# Patient Record
Sex: FEMALE | Race: WHITE | Employment: OTHER | URBAN - METROPOLITAN AREA
[De-identification: names, ages, dates, MRNs, and addresses within clinical notes are randomized per-mention and may not be internally consistent; named-entity substitution may affect disease eponyms.]

---

## 2017-12-05 ENCOUNTER — HOSPITAL ENCOUNTER (OUTPATIENT)
Dept: PHYSICAL THERAPY | Age: 77
Discharge: HOME | End: 2017-12-05
Payer: MEDICARE

## 2017-12-05 PROCEDURE — 97162 PT EVAL MOD COMPLEX 30 MIN: CPT

## 2017-12-05 PROCEDURE — G8979 MOBILITY GOAL STATUS: HCPCS

## 2017-12-05 PROCEDURE — G8978 MOBILITY CURRENT STATUS: HCPCS

## 2017-12-05 PROCEDURE — 97110 THERAPEUTIC EXERCISES: CPT

## 2017-12-05 NOTE — PROGRESS NOTES
PHYSICAL THERAPY - DAILY TREATMENT NOTE    Patient Name: Raymon Crabtree        Date: 2017  : 1940   yes Patient  Verified  Visit #:     Insurance: Payor: Jimmy Negron / Plan: [place] MEDICARE PART A & B / Product Type: Medicare /      In time: 9:30 Out time: 10:18   Total Treatment Time: 48     Medicare Time Tracking (below)   Total Timed Codes (min):  na 1:1 Treatment Time:  na     TREATMENT AREA =  Low back pain [M54.5]    SUBJECTIVE  Pain Level (on 0 to 10 scale):   10   Medication Changes/New allergies or changes in medical history, any new surgeries or procedures?    no  If yes, update Summary List   Subjective Functional Status/Changes:  []  No changes reported     See POC          OBJECTIVE  10 min Therapeutic Exercise:  [x]  See flow sheet   Rationale:      increase ROM and increase strength to improve the patients ability to ambulate. Stand, transfer      min Patient Education:  yes  Reviewed HEP   []  Progressed/Changed HEP based on: Other Objective/Functional Measures:    Pt demo I w/ HEP  Inc L HS cramping while performing supine TA draw; cues for muscle isolation reduced hs cramp     Post Treatment Pain Level (on 0 to 10) scale:   3  / 10     ASSESSMENT  Assessment/Changes in Function:     See POC     []  See Progress Note/Recertification   Patient will continue to benefit from skilled PT services to modify and progress therapeutic interventions, address functional mobility deficits, address ROM deficits, address strength deficits, analyze and address soft tissue restrictions, analyze and cue movement patterns, analyze and modify body mechanics/ergonomics, address imbalance/dizziness and instruct in home and community integration to attain remaining goals.    Progress toward goals / Updated goals:    See POC     PLAN  []  Upgrade activities as tolerated yes Continue plan of care   []  Discharge due to :    []  Other:      Therapist: Bautista Dong, PT    Date: 12/5/2017 Time: 9:14 AM     Future Appointments  Date Time Provider Sekou Major   12/5/2017 9:30 AM EPI [place] [facility]
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
## 2017-12-07 ENCOUNTER — HOSPITAL ENCOUNTER (OUTPATIENT)
Dept: PHYSICAL THERAPY | Age: 77
Discharge: HOME | End: 2017-12-07
Payer: MEDICARE

## 2017-12-07 PROCEDURE — 97110 THERAPEUTIC EXERCISES: CPT

## 2017-12-07 PROCEDURE — 97140 MANUAL THERAPY 1/> REGIONS: CPT

## 2017-12-11 ENCOUNTER — HOSPITAL ENCOUNTER (OUTPATIENT)
Dept: PHYSICAL THERAPY | Age: 77
Discharge: HOME | End: 2017-12-11
Payer: MEDICARE

## 2017-12-11 PROCEDURE — 97140 MANUAL THERAPY 1/> REGIONS: CPT

## 2017-12-11 NOTE — PROGRESS NOTES
PHYSICAL THERAPY - DAILY TREATMENT NOTE    Patient Name: Lizzie Butler        Date: 2017  : 1940   {YES/NO DIET:280920208} Patient  Verified  Visit #:   ***   of   ***  Insurance: Payor: Fang Favors / Plan: [place] MEDICARE PART A & B / Product Type: Medicare /      In time: *** Out time: ***   Total Treatment Time: ***     Medicare Time Tracking (below)   Total Timed Codes (min):  *** 1:1 Treatment Time:  ***     TREATMENT AREA =  Low back pain [M54.5]    SUBJECTIVE  Pain Level (on 0 to 10 scale):  ***  / 10   Medication Changes/New allergies or changes in medical history, any new surgeries or procedures?     {YES/NO DIET:779633347}  If yes, update Summary List   Subjective Functional Status/Changes:  []  No changes reported     ***          OBJECTIVE  Modalities Rationale:     {[facility] INMOTION MODALITIES:28639} to improve patient's ability to ***   min [] Estim, type/location:                                      []  att     []  unatt     []  w/US     []  w/ice    []  w/heat    min []  Mechanical Traction: type/lbs                   []  pro   []  sup   []  int   []  cont    []  before manual    []  after manual    min []  Ultrasound, settings/location:      min []  Iontophoresis w/ dexamethasone, location:                                               []  take home patch       []  in clinic    min []  Ice     []  Heat    location/position:     min []  Vasopneumatic Device, press/temp:     min []  Other:    [] Skin assessment post-treatment (if applicable):    []  intact    []  redness- no adverse reaction     []redness  adverse reaction:        *** min Therapeutic Exercise:  [x]  See flow sheet   Rationale:      {[facility] IMMOTION THER EX:65593} to improve the patients ability to ***     *** min Manual Therapy:    Rationale:      {[facility] IMMOTION MANUAL THERAPY:76554} to improve patient's ability to ***     min Therapeutic Activity:    Rationale:    {[facility] IMMOTION THER EX:08196} to improve the patients ability to ***     min Neuromuscular Re-ed:    Rationale:    {[facility] IMMOTION THER EX:27131} to improve the patients ability to ***     min Gait Training:    Rationale:         min Patient Education:  {YES/NO DIET:639485126}  Reviewed HEP   []  Progressed/Changed HEP based on: Other Objective/Functional Measures:    ***     Post Treatment Pain Level (on 0 to 10) scale:   ***  / 10     ASSESSMENT  Assessment/Changes in Function:     ***     []  See Progress Note/Recertification   Patient will continue to benefit from skilled PT services to {[facility] INMOTION ASSESSMENT STATEMENTS:20159} to attain remaining goals. Progress toward goals / Updated goals:    ***     PLAN  []  Upgrade activities as tolerated {YES/NO DIET:102691166} Continue plan of care   []  Discharge due to :    []  Other:      Therapist: Monica Dasilva PT    Date: 12/11/2017 Time: 10:13 AM     Future Appointments  Date Time Provider Sekou Major   12/14/2017 12:00 PM Kenya Hudson, PT [facility]   12/19/2017 9:30 AM Kenya Ham [facility]   12/27/2017 3:30 PM Monica Dasilva, PT [facility]   12/29/2017 9:30 AM David Tony, PT [facility]   1/2/2018 9:30 AM Kenya Hudson, PT [facility]   1/5/2018 9:30 AM Regina Paige, PT [facility]   1/9/2018 9:30 AM Monica Dasilva, PT [facility]   1/11/2018 9:30 AM Kenya Hudson, [address] [place] [address]

## 2017-12-11 NOTE — PROGRESS NOTES
PHYSICAL THERAPY - DAILY TREATMENT NOTE    Patient Name: Neeraj Goodwin        Date: 2017  : 1940   yes Patient  Verified  Visit #:   3   of   12  Insurance: Payor: Cristine Calhoun / Plan: [place] MEDICARE PART A & B / Product Type: Medicare /      In time: 9:55 Out time: 11:05   Total Treatment Time: 70     Medicare Time Tracking (below)   Total Timed Codes (min):  60 1:1 Treatment Time:  15     TREATMENT AREA =  Low back pain [M54.5]    SUBJECTIVE  Pain Level (on 0 to 10 scale): 0  / 10   Medication Changes/New allergies or changes in medical history, any new surgeries or procedures?    no  If yes, update Summary List   Subjective Functional Status/Changes:  []  No changes reported     Pt reports tightness/soreness worse L side l/s paraspinals today. Pt denies falls or red flags. Pt reports compliance with HEP.          OBJECTIVE  Modalities Rationale:     decrease pain and decrease muscle spasm to improve patient's ability to complete amb, standing, transfers   min [] Estim, type/location:                                      []  att     []  unatt     []  w/US     []  w/ice    []  w/heat    min []  Mechanical Traction: type/lbs                   []  pro   []  sup   []  int   []  cont    []  before manual    []  after manual    min []  Ultrasound, settings/location:      min []  Iontophoresis w/ dexamethasone, location:                                               []  take home patch       []  in clinic   10 min []  Ice     [x]  Heat    location/position: Supine to l/s and glutes    min []  Vasopneumatic Device, press/temp:     min []  Other:    [x] Skin assessment post-treatment (if applicable):    [x]  intact    [x]  no adverse reaction     []redness  adverse reaction:        45 min Therapeutic Exercise:  [x]  See flow sheet (Not billed)   Rationale:      increase ROM, increase strength and improve balance to improve the patients ability to complete amb, standing, transfers    15 min Manual Therapy: STM to L>R l/s paraspinals, glute max/med, piriformis in prone. (Billed 15 minutes)   Rationale:      decrease pain, increase ROM and decrease trigger points to improve patient's ability to complete amb, standing, transfers    N/A min Patient Education:  yes  Reviewed HEP   []  Progressed/Changed HEP based on: Other Objective/Functional Measures:    Increased spasm L l/s paraspinals and pirifromis     Post Treatment Pain Level (on 0 to 10) scale:   2 / 10     ASSESSMENT  Assessment/Changes in Function:   Pt reported soreness at end of session. Pt denied sharp pains or red flags with therapeutic ex. []  See Progress Note/Recertification   Patient will continue to benefit from skilled PT services to modify and progress therapeutic interventions, address functional mobility deficits, address ROM deficits, address strength deficits, analyze and address soft tissue restrictions, analyze and cue movement patterns, analyze and modify body mechanics/ergonomics and address imbalance/dizziness to attain remaining goals. Progress toward goals / Updated goals:  Pt reports compliance with HEP - progressing towards STG #1. PLAN  [x]  Upgrade activities as tolerated yes Continue plan of care   []  Discharge due to :    []  Other:      Therapist: Ballard Blizzard, PT    Date: 12/11/2017 Time: 10:00 AM     Future Appointments  Date Time Provider Sekou Major   12/14/2017 12:00 PM Kenya Rome, PT [facility]   12/19/2017 9:30 AM Kenya Cason [facility]   12/21/2017 10:30 AM Ballard Blizzard, PT [facility]   12/28/2017 10:00 AM Kenya Rome, PT [facility]   12/29/2017 9:30 AM Daina Tony, PT [facility]   1/2/2018 9:30 AM Kenya Rome, PT [facility]   1/5/2018 9:30 AM Kayleigh Lima, PT [facility]   1/9/2018 9:30 AM Ballard Blizzard, PT [facility]   1/11/2018 9:30 AM Kenya Rome, [facility]

## 2017-12-14 ENCOUNTER — HOSPITAL ENCOUNTER (OUTPATIENT)
Dept: PHYSICAL THERAPY | Age: 77
Discharge: HOME | End: 2017-12-14
Payer: MEDICARE

## 2017-12-14 PROCEDURE — 97110 THERAPEUTIC EXERCISES: CPT

## 2017-12-14 PROCEDURE — 97140 MANUAL THERAPY 1/> REGIONS: CPT

## 2017-12-14 NOTE — PROGRESS NOTES
PHYSICAL THERAPY - DAILY TREATMENT NOTE    Patient Name: Royal Wolff        Date: 2017  : 1940   yes Patient  Verified  Visit #:     Insurance: Payor: Lila Beans / Plan: [place] MEDICARE PART A & B / Product Type: Medicare /      In time:  Out time:    Total Treatment Time: 62     Medicare Time Tracking (below)   Total Timed Codes (min):  56 1:1 Treatment Time:  41     TREATMENT AREA =  Low back pain [M54.5]    SUBJECTIVE  Pain Level (on 0 to 10 scale):  3  / 10   Medication Changes/New allergies or changes in medical history, any new surgeries or procedures?    no  If yes, update Summary List   Subjective Functional Status/Changes:  []  No changes reported     \"Tuesday I cleared a lot of stuff in my yard. I was on my knees and pulling out the weeds. I had to lay low yesterday\"          OBJECTIVE  Modalities Rationale:       min [] Estim, type/location:                                      []  att     []  unatt     []  w/US     []  w/ice    []  w/heat    min []  Mechanical Traction: type/lbs                   []  pro   []  sup   []  int   []  cont    []  before manual    []  after manual    min []  Ultrasound, settings/location:      min []  Iontophoresis w/ dexamethasone, location:                                               []  take home patch       []  in clinic   TC min []  Ice     [x]  Heat    location/position: Supine to l/s and glutes    min []  Vasopneumatic Device, press/temp:     min []  Other:    [] Skin assessment post-treatment (if applicable):    []  intact    []  redness- no adverse reaction     []redness  adverse reaction:        31  (41) min Therapeutic Exercise:  [x]  See flow sheet   Rationale:     increase ROM, increase strength and improve balance to improve the patients ability to complete amb, standing, transfers    10 min Manual Therapy: STM to L>R l/s paraspinals, glute max/med in prone.    Rationale:       decrease pain, increase ROM and decrease trigger points to improve patient's ability to complete amb, standing, transfers         min Patient Education:  yes  Reviewed HEP   []  Progressed/Changed HEP based on: Other Objective/Functional Measures:    -increased tone to b/l l/s paraspinals/glutes     Post Treatment Pain Level (on 0 to 10) scale:   3  / 10     ASSESSMENT  Assessment/Changes in Function:     Pt with slowly improving core strength. Pt ed on need for activity pacing to avoid increased L/S strain as per apparent exacerbation of pain today related to over working with yard work. Pt verbalizes understanding     []  See Progress Note/Recertification   Patient will continue to benefit from skilled PT services to modify and progress therapeutic interventions, address ROM deficits, address strength deficits and analyze and address soft tissue restrictions to attain remaining goals. Progress toward goals / Updated goals:    Progressing towards STG #1, 2     PLAN  []  Upgrade activities as tolerated yes Continue plan of care   []  Discharge due to :    []  Other:      Therapist: Lyn Casarez, PT    Date: 12/14/2017 Time: 10:46 AM     Future Appointments  Date Time Provider Sekou Major   12/14/2017 12:00 PM Kenya Casarez, PT [facility]   12/19/2017 9:30 AM Kenya Ponce [facility]   12/21/2017 10:30 AM Shane Willett, PT [facility]   12/28/2017 10:00 AM Kenya Casarez, PT [facility]   12/29/2017 9:30 AM Tommy Tony, PT [facility]   1/2/2018 9:30 AM Kenya Casarez, PT [facility]   1/5/2018 9:30 AM Elkin Marquis, PT [facility]   1/9/2018 9:30 AM Shane Willett, PT [facility]   1/11/2018 9:30 AM Kenya Casarez, [place] [facility]

## 2017-12-19 ENCOUNTER — HOSPITAL ENCOUNTER (OUTPATIENT)
Dept: PHYSICAL THERAPY | Age: 77
Discharge: HOME | End: 2017-12-19
Payer: MEDICARE

## 2017-12-19 PROCEDURE — 97110 THERAPEUTIC EXERCISES: CPT

## 2017-12-19 PROCEDURE — 97140 MANUAL THERAPY 1/> REGIONS: CPT

## 2017-12-19 NOTE — PROGRESS NOTES
PHYSICAL THERAPY - DAILY TREATMENT NOTE    Patient Name: Nicholas Walker        Date: 2017  : 1940   yes Patient  Verified  Visit #:     Insurance: Payor: Gretchen Magana / Plan: [place] MEDICARE PART A & B / Product Type: Medicare /      In time: 930 Out time: 1030   Total Treatment Time: 60     Medicare Time Tracking (below)   Total Timed Codes (min):  60 1:1 Treatment Time:  45     TREATMENT AREA =  Low back pain [M54.5]    SUBJECTIVE  Pain Level (on 0 to 10 scale):  3  / 10   Medication Changes/New allergies or changes in medical history, any new surgeries or procedures?    no  If yes, update Summary List   Subjective Functional Status/Changes:  []  No changes reported     \"The pain's there constantly. It doesn't leave, I just am acclimatized to it\"       OBJECTIVE  Modalities Rationale:       min [] Estim, type/location:                                      []  att     []  unatt     []  w/US     []  w/ice    []  w/heat    min []  Mechanical Traction: type/lbs                   []  pro   []  sup   []  int   []  cont    []  before manual    []  after manual    min []  Ultrasound, settings/location:      min []  Iontophoresis w/ dexamethasone, location:                                               []  take home patch       []  in clinic   PD min []  Ice     [x]  Heat    location/position: Supine to l/s and glutes    min []  Vasopneumatic Device, press/temp:     min []  Other:    [] Skin assessment post-treatment (if applicable):    []  intact    []  redness- no adverse reaction     []redness  adverse reaction:        35  (50) min Therapeutic Exercise:  [x]  See flow sheet   Rationale:     increase ROM, increase strength and improve balance to improve the patients ability to complete amb, standing, transfers    10 min Manual Therapy: STM to L>R l/s paraspinals, glute max/med in prone. Manual hamstring stretch.    Rationale:       decrease pain, increase ROM and decrease trigger points to improve patient's ability to complete amb, standing, transfers         min Patient Education:  yes  Reviewed HEP   []  Progressed/Changed HEP based on: Other Objective/Functional Measures:    -TTP with increased tone to b/l QL and glutes    -added bridge, TA march in hooklying.  - pt c/o right posterior shoulder pain with bridge and marching; addressed with manual hamstring stretch and SKC 2x30 seconds with pt reporting reduced pain on re-test (pt re-ed on self hamstring stretch with sheet and SKC stretching as per HEP handout on chart)     Post Treatment Pain Level (on 0 to 10) scale:   2  / 10     ASSESSMENT  Assessment/Changes in Function:     Pt demonstrates slow improvement to core strength as per ability to progress with exercise program this session. []  See Progress Note/Recertification   Patient will continue to benefit from skilled PT services to modify and progress therapeutic interventions, address ROM deficits, address strength deficits and analyze and address soft tissue restrictions to attain remaining goals. Progress toward goals / Updated goals:    Progressing towards STG #1, 2  And LTG #1 with addition of bridges this session     PLAN  []  Upgrade activities as tolerated yes Continue plan of care   []  Discharge due to :    []  Other:      Therapist: Daniel Peralta, PT    Date: 12/19/2017 Time: 10:46 AM     Future Appointments  Date Time Provider Sekou Major   12/19/2017 9:30 AM Kenya Malloy [facility]   12/21/2017 10:30 AM Marce Suggs, PT [facility]   12/28/2017 10:00 AM Kenya Peralta, PT [facility]   12/29/2017 9:30 AM Adrienne Tony, PT [facility]   1/2/2018 9:30 AM Kenya Peralta, PT [facility]   1/5/2018 9:30 AM Renan Mckeon, PT [facility]   1/9/2018 9:30 AM Marce Suggs, PT [facility]   1/11/2018 9:30 AM Kenya Peralta, [place] [facility]

## 2017-12-21 ENCOUNTER — HOSPITAL ENCOUNTER (OUTPATIENT)
Dept: PHYSICAL THERAPY | Age: 77
Discharge: HOME | End: 2017-12-21
Payer: MEDICARE

## 2017-12-21 PROCEDURE — 97110 THERAPEUTIC EXERCISES: CPT

## 2017-12-21 PROCEDURE — 97140 MANUAL THERAPY 1/> REGIONS: CPT

## 2017-12-21 NOTE — PROGRESS NOTES
PHYSICAL THERAPY - DAILY TREATMENT NOTE    Patient Name: Rico Roman        Date: 2017  : 1940   yes Patient  Verified  Visit #:     Insurance: Payor: Dia Pitt / Plan: [place] MEDICARE PART A & B / Product Type: Medicare /      In time: 10:28 Out time: 11:44   Total Treatment Time: 76     Medicare Time Tracking (below)   Total Timed Codes (min):  76 1:1 Treatment Time:  70     TREATMENT AREA =  Low back pain [M54.5]    SUBJECTIVE  Pain Level (on 0 to 10 scale): 2-3  / 10   Medication Changes/New allergies or changes in medical history, any new surgeries or procedures?    no  If yes, update Summary List   Subjective Functional Status/Changes:  []  No changes reported     Avg pain level 2-3/10, Max pain level 9/10, GROC: +1, FOTO Score: 65/100, Pt reports compliance with HEP. Pt denies falls or red flags.        OBJECTIVE  Modalities Rationale:     N/A to improve patient's ability to complete N/A - pt declined   min [] Estim, type/location:                                      []  att     []  unatt     []  w/US     []  w/ice    []  w/heat    min []  Mechanical Traction: type/lbs                   []  pro   []  sup   []  int   []  cont    []  before manual    []  after manual    min []  Ultrasound, settings/location:      min []  Iontophoresis w/ dexamethasone, location:                                               []  take home patch       []  in clinic    min []  Ice     []  Heat    location/position:     min []  Vasopneumatic Device, press/temp:     min []  Other:    [] Skin assessment post-treatment (if applicable):    []  intact    []  no adverse reaction     []redness  adverse reaction:        61 min Therapeutic Exercise:  [x]  See flow sheet (Billed 55 minutes)   Rationale:      increase ROM, increase strength and improve balance to improve the patients ability to complete amb, standing, transfers    15 min Manual Therapy: STM to R>L l/s paraspinals, glute max/med, piriformis in prone. (Billed 15 minutes)   Rationale:      decrease pain, increase ROM and decrease trigger points to improve patient's ability to complete amb, standing, transfers    N/A min Patient Education:  yes  Reviewed HEP   []  Progressed/Changed HEP based on:  Pt given copy of dead bug to add to HEP. Pt reports understanding. Other Objective/Functional Measures:    *Increased reps to improve LE/core strength/stability     Post Treatment Pain Level (on 0 to 10) scale:   2-3 / 10     ASSESSMENT  Assessment/Changes in Function:   Pt denied sharp pains or red flags with therapeutic ex. []  See Progress Note/Recertification   Patient will continue to benefit from skilled PT services to modify and progress therapeutic interventions, address functional mobility deficits, address ROM deficits, address strength deficits, analyze and address soft tissue restrictions, analyze and cue movement patterns, analyze and modify body mechanics/ergonomics and address imbalance/dizziness to attain remaining goals. Progress toward goals / Updated goals:  Pt has met LTG #3 - FOTO score 65/100. Pt able to complete 10x5 seconds bridge without increase in pain - progressing towards LTG #1. PLAN  [x]  Upgrade activities as tolerated yes Continue plan of care   []  Discharge due to :    []  Other:      Therapist: Sangita Desir, PT    Date: 12/21/2017 Time: 10:28 AM     Future Appointments  Date Time Provider Sekou Major   12/28/2017 10:00 AM Kenya Phillips, PT [facility]   12/29/2017 9:30 AM Tawny Tony, PT [facility]   1/2/2018 9:30 AM Kenya Phillips, PT [facility]   1/5/2018 9:30 AM Yanet Pennington, PT [facility]   1/9/2018 9:30 AM Sangita Desir, PT [facility]   1/11/2018 9:30 AM Kenya Phillips, [facility]

## 2017-12-27 ENCOUNTER — APPOINTMENT (OUTPATIENT)
Dept: PHYSICAL THERAPY | Age: 77
End: 2017-12-27
Payer: MEDICARE

## 2017-12-28 ENCOUNTER — HOSPITAL ENCOUNTER (OUTPATIENT)
Dept: PHYSICAL THERAPY | Age: 77
Discharge: HOME | End: 2017-12-28
Payer: MEDICARE

## 2017-12-28 PROCEDURE — 97110 THERAPEUTIC EXERCISES: CPT

## 2017-12-28 PROCEDURE — 97140 MANUAL THERAPY 1/> REGIONS: CPT

## 2017-12-28 NOTE — PROGRESS NOTES
PHYSICAL THERAPY - DAILY TREATMENT NOTE    Patient Name: Lizzie Butler        Date: 2017  : 1940   yes Patient  Verified  Visit #:     Insurance: Payor: Fang Favors / Plan: [place] MEDICARE PART A & B / Product Type: Medicare /      In time: 245 Out time: 1050   Total Treatment Time: 52     Medicare Time Tracking (below)   Total Timed Codes (min):  52 1:1 Treatment Time:  32     TREATMENT AREA =  Low back pain [M54.5]    SUBJECTIVE  Pain Level (on 0 to 10 scale):  2-3  / 10   Medication Changes/New allergies or changes in medical history, any new surgeries or procedures?    no  If yes, update Summary List   Subjective Functional Status/Changes:  []  No changes reported     \"the pain is always there. 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 10 min Manual Therapy: STM to L/s paraspinals, glute max, piriformis in prone. Rationale:      decrease pain, increase ROM and decrease trigger points to improve patient's ability to complete amb, standing, transfers       min Patient Education:  yes  Reviewed HEP   []  Progressed/Changed HEP based on: Other Objective/Functional Measures:    Pt re-ed at length regarding need for compliance with HEP in order to improve core strength/stability for long term LBP management. Re-issued HEP handout as pt reported losing her handouts recently. 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 Post Treatment Pain Level (on 0 to 10) scale:   0  / 10     ASSESSMENT  Assessment/Changes in Function:     Pt reports min/mod fatigue with exercises indicating appropriate intensity.   Pt able to increase hold time with bridges, though 1 less rep tolerated indicating improving strength. Pt cued for technique with dead bug and noted poor+ strength/stability. Will benefit from skilled progression of exercises to improve core stability and strength for improved activity tolerance. []  See Progress Note/Recertification   Patient will continue to benefit from skilled PT services to modify and progress therapeutic interventions, address functional mobility deficits, address ROM deficits, address strength deficits, analyze and address soft tissue restrictions and analyze and modify body mechanics/ergonomics to attain remaining goals. Progress toward goals / Updated goals: · Short Term Goals: To be accomplished in  2  weeks:  1. Pt will be I and compliant with HEP for self management of sx. 12/28: Goal met; performs 1 x/day. 2. Dec max pain </= 5/10 to improve activity tolerance. 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 · Long Term Goals: To be accomplished in  6  weeks:  1. Pt will perform 10x10\" supine bridge w/ proper form, pain-free to improve axial stability for ADLs. 12/28: Goal in progress; 9x 10\" to 75% AROM with 0/10 pain reported. 2. Pt will maintain MSR balance w/ EC x 30\" w/o LOB to dec risk of falls. -12/28: Goal in progress; instep, requires occasional UE support x 30\". 3. Improve FOTO score to >/= 59/100 to indicate improved function     PLAN  []  Upgrade activities as tolerated yes Continue plan of care   []  Discharge due to :    []  Other:      Therapist: Michael Rachel. Dang Mandujano, PT    Date: 12/28/2017 Time: 7:50 AM     Future Appointments  Date Time Provider Sekou Major   12/28/2017 10:00 AM Kenya Mandujano, PT [facility]   12/29/2017 9:30 AM Ashvin Tony, PT [facility]   1/2/2018 9:30 AM Kenya Mandujano, PT [facility]   1/5/2018 9:30 AM Kaylen Clinton, PT [facility]   1/9/2018 9:30 AM Rhonda Denis, PT [facility]   1/11/2018 9:30 AM Kenya Mandujano [facility]

## 2017-12-29 ENCOUNTER — HOSPITAL ENCOUNTER (OUTPATIENT)
Dept: PHYSICAL THERAPY | Age: 77
Discharge: HOME | End: 2017-12-29
Payer: MEDICARE

## 2017-12-29 PROCEDURE — 97140 MANUAL THERAPY 1/> REGIONS: CPT

## 2017-12-29 NOTE — PROGRESS NOTES
PHYSICAL THERAPY - DAILY TREATMENT NOTE    Patient Name: Meredith Carrel        Date: 2017  : 1940   yes Patient  Verified  Visit #:     Insurance: Payor: Don Gottron / Plan: [place] MEDICARE PART A & B / Product Type: Medicare /      In time: 9:30 Out time: 10:25   Total Treatment Time: 55     Medicare Time Tracking (below)   Total Timed Codes (min):  55 1:1 Treatment Time:  15     TREATMENT AREA =  Low back pain [M54.5]    SUBJECTIVE  Pain Level (on 0 to 10 scale):  2  / 10   Medication Changes/New allergies or changes in medical history, any new surgeries or procedures?    no  If yes, update Summary List   Subjective Functional Status/Changes:  []  No changes reported     Pt reports temporary relief following tx sessions but states she wakes the next moring w/ inc pain. Pt reports she left her exercises here last visit so she has not been able to perform HEP          OBJECTIVE  45 min Therapeutic Exercise:  [x]  See flow sheet   Rationale:      increase ROM and increase strength to improve the patients ability to complete ADLs     10 min Manual Therapy: STM to B l/s emily, glute min, piriformis, sacral flex mob   Rationale:      decrease pain, increase ROM, increase tissue extensibility and decrease trigger points to improve patient's ability to complete ADLs     min Patient Education:  yes  Reviewed HEP   []  Progressed/Changed HEP based on: Other Objective/Functional Measures:    5' 1:1 TE (NC)    Pt cont to demo poor balance w/ EC requiring frequent hha to regain balance or eye opening  Min ttp to the R gluteal musculature     Post Treatment Pain Level (on 0 to 10) scale:   0  / 10     ASSESSMENT  Assessment/Changes in Function:     Pt reported absent pain post tx session. Pt provided w/ another copy of HEP and was educated at length importance of compliance in order to improve long-term carry over.  Pt acknowledged understanding     []  See Progress Note/Recertification Patient will continue to benefit from skilled PT services to modify and progress therapeutic interventions, address functional mobility deficits, address ROM deficits, address strength deficits, analyze and address soft tissue restrictions, analyze and cue movement patterns, analyze and modify body mechanics/ergonomics, assess and modify postural abnormalities and instruct in home and community integration to attain remaining goals. Progress toward goals / Updated goals:    Min progress to STG #1, reports non compliance to HEP     PLAN  []  Upgrade activities as tolerated yes Continue plan of care   []  Discharge due to :    []  Other:      Therapist: Pascual Nicole, PT    Date: 12/29/2017 Time: 10:30 AM     Future Appointments  Date Time Provider Sekou Major   1/2/2018 9:30 AM Kenya Garibay, PT [facility]   1/5/2018 9:30 AM Pascual Nicole, PT [facility]   1/9/2018 9:30 AM Isabelle Moreno, PT [facility]   1/11/2018 9:30 AM Kenya Garibay, [place] [facility]

## 2018-01-02 ENCOUNTER — HOSPITAL ENCOUNTER (OUTPATIENT)
Dept: PHYSICAL THERAPY | Age: 78
Discharge: HOME | End: 2018-01-02
Payer: MEDICARE

## 2018-01-02 PROCEDURE — G8978 MOBILITY CURRENT STATUS: HCPCS

## 2018-01-02 PROCEDURE — 97140 MANUAL THERAPY 1/> REGIONS: CPT

## 2018-01-02 PROCEDURE — 97110 THERAPEUTIC EXERCISES: CPT

## 2018-01-02 PROCEDURE — G8979 MOBILITY GOAL STATUS: HCPCS

## 2018-01-02 NOTE — PROGRESS NOTES
PHYSICAL THERAPY - DAILY TREATMENT NOTE    Patient Name: Rocio Sandoval        Date: 2018  : 1940   yes Patient  Verified  Visit #:     Insurance: Payor: Phong Farley / Plan: [place] MEDICARE PART A & B / Product Type: Medicare /      In time: 9:30 Out time: 1030   Total Treatment Time: 60     Medicare Time Tracking (below)   Total Timed Codes (min):  54 1:1 Treatment Time:  25     TREATMENT AREA =  Low back pain [M54.5]    SUBJECTIVE  Pain Level (on 0 to 10 scale):  2  / 10   Medication Changes/New allergies or changes in medical history, any new surgeries or procedures?    no  If yes, update Summary List   Subjective Functional Status/Changes:  []  No changes reported     \"My back has been feeling looser. 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 Post Treatment Pain Level (on 0 to 10) scale:   0  / 10     ASSESSMENT  Assessment/Changes in Function:     Ongoing hip/core weakness as per above and fatigue noted with all exercises. 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        []  See Progress Note/Recertification   Patient will continue to benefit from skilled PT services to modify and progress therapeutic interventions, address functional mobility deficits, address ROM deficits, address strength deficits, analyze and address soft tissue restrictions, analyze and cue movement patterns, analyze and modify body mechanics/ergonomics, assess and modify postural abnormalities and instruct in home and community integration to attain remaining goals. Progress toward goals / Updated goals: · Short Term Goals: To be accomplished in  2  weeks:  1. Pt will be I and compliant with HEP for self management of sx. -1/2/18: reports compliance. 2. Dec max pain </= 5/10 to improve activity tolerance. -1/2/18: not met; 7/10 with heavy activities    · Long Term Goals: To be accomplished in  6  weeks:  1. Pt will perform 10x10\" supine bridge w/ proper form, pain-free to improve axial stability for ADLs. -1/2/18: Goal in progress; minimal \"annoyance\", but denies pain. AROM to 90%. 2. Pt will maintain MSR balance w/ EC x 30\" w/o LOB to dec risk of falls.  -1/2/18: Goal in progress; occasional use of UE's for stability with heel to big toe. 3. Improve FOTO score to >/= 59/100 to indicate improved function     PLAN  []  Upgrade activities as tolerated yes Continue plan of care   []  Discharge due to :    []  Other:      Therapist: Sonia Amor. Julien Garibay, PT    Date: 1/2/2018 Time: 10:30 AM     Future Appointments  Date Time Provider Sekou Major   1/5/2018 9:30 AM Pascual Nicole, E[facility]   1/9/2018 9:30 AM Isabelle Moreno PT [facility]   1/11/2018 9:30 AM Yanira Garibay, [facility]

## 2018-01-05 ENCOUNTER — APPOINTMENT (OUTPATIENT)
Dept: PHYSICAL THERAPY | Age: 78
End: 2018-01-05
Payer: MEDICARE

## 2018-01-09 ENCOUNTER — HOSPITAL ENCOUNTER (OUTPATIENT)
Dept: PHYSICAL THERAPY | Age: 78
Discharge: HOME | End: 2018-01-09
Payer: MEDICARE

## 2018-01-09 PROCEDURE — 97140 MANUAL THERAPY 1/> REGIONS: CPT

## 2018-01-09 PROCEDURE — 97110 THERAPEUTIC EXERCISES: CPT

## 2018-01-09 NOTE — PROGRESS NOTES
[address] PHYSICAL THERAPY   [facility], [place] [address],[facility], [facility] - Phone: [phone]  Fax: [phone] PHYSICAL THERAPY          Patient Name: Rani Zapata : 1940   Treatment/Medical Diagnosis: Low back pain [M54.5]   Onset Date: Chronic    Referral Source: Genet Chen DO Start of Care [facility]): 17   Prior Hospitalization: See Medical History Provider #: [id number]   Prior Level of Function: Hx Chronic LBP; walks 30-40 mins per day for exercise   Comorbidities: Hiatal Hernia, Osteoporosis, OA, Possible Cardiac Condition, Sigmoidectomy    Medications: Verified on Patient Summary List   Visits from [facility]: 9 Missed Visits: 0     Goal/Measure of Progress Goal Met? 1. Pt will perform 10x10\" supine bridge with proper form, pain-free to improve axial stability for ADLs. Status at last Eval: Unable Current Status: 90% AROM, \"annoyance\" but no pain Progressing   2. Pt will maintain MSR balance with EC x 30 seconds without LOB to dec risk of falls. Status at last Eval: Unable Current Status: Occasional use of UE for stability with heel to big toe Progressing   3. Improve FOTO score to >/= 59/100 to indicate improved function. Status at last Eval: 53/100 Current Status: 65/100 yes     Key Functional Changes/Progress: Pt demonstrates fair progress with PT. Pt reports 2-3/10 avg pain level, 7/10 max pain level, +1 on GROC, 65/100 FOTO score and compliance with HEP. Pt's MMT hip ext 3+/5, hip ABD R 3+/5, L 3/5.   Problem List: pain affecting function, decrease strength, decrease ADL/ functional abilitiies and decrease activity tolerance   Treatment Plan may include any combination of the following: Therapeutic exercise, Therapeutic activities, Neuromuscular re-education, Physical agent/modality, Gait/balance training, Manual therapy, Patient education, Self Care training, Functional mobility training and Home safety training  Patient Goal(s) has been updated and includes:      Goals for this certification period include and are to be achieved in   3-4  weeks:  1. Pt will perform 10x10\" supine bridge with proper form, pain-free to improve axial stability for ADLs. 2. Pt will maintain MSR balance with EC x 30 seconds without LOB to dec risk of falls. 3. Pt to demonstrate independence with extensive HEP to prepare for discharge from PT. Frequency / Duration:   Patient to be seen   1-2   times per week for   3-4    weeks:  G-Codes (GP): Mobility [id number] Current  CJ= 20-39%   Goal  CK= 40-59%. The severity rating is based on the FOTO Score    Assessments/Recommendations: Recommend patient continue with PT to further improve core/glute/LE strength/stability, body mechanics and functional mobility. Please advise. Thank you. If you have any questions/comments please contact us directly at [phone]. Thank you for allowing us to assist in the care of your patient. Therapist Signature: Isabelle Moreno PT Date: 5/2/3624   Certification Period:  Reporting Period: 12/5/17 to 3/2/18  12/5/17 to 1/2/18 Time: 8:27 AM   NOTE TO PHYSICIAN:  PLEASE COMPLETE THE ORDERS BELOW AND FAX TO   [facility], [place] Physical Therapy: [phone]  If you are unable to process this request in 24 hours please contact our office: [phone]    ___ I have read the above report and request that my patient continue as recommended.   ___ I have read the above report and request that my patient continue therapy with the following changes/special instructions: ________________________________________________   ___ I have read the above report and request that my patient be discharged from therapy.      Physician Signature:        Date:       Time:

## 2018-01-09 NOTE — PROGRESS NOTES
PHYSICAL THERAPY - DAILY TREATMENT NOTE    Patient Name: Selene Ma        Date: 2018  : 1940   yes Patient  Verified  Visit #:   10   of   12  Insurance: Payor: Sarah Urrutia / Plan: [place] MEDICARE PART A & B / Product Type: Medicare /      In time: 9:30 Out time: 10:30   Total Treatment Time: 60     Medicare Time Tracking (below)   Total Timed Codes (min):  60 1:1 Treatment Time:  40     TREATMENT AREA =  Low back pain [M54.5]    SUBJECTIVE  Pain Level (on 0 to 10 scale): 3  / 10   Medication Changes/New allergies or changes in medical history, any new surgeries or procedures?    no  If yes, update Summary List   Subjective Functional Status/Changes:  []  No changes reported   Pt reports her back is sore from shoveling snow and brushing snow off the car. Pt denies falls or red flags. Pt reports compliance with HEP.      OBJECTIVE  Modalities Rationale:     N/A to improve patient's ability to complete N/A - pt declined   min [] Estim, type/location:                                      []  att     []  unatt     []  w/US     []  w/ice    []  w/heat    min []  Mechanical Traction: type/lbs                   []  pro   []  sup   []  int   []  cont    []  before manual    []  after manual    min []  Ultrasound, settings/location:      min []  Iontophoresis w/ dexamethasone, location:                                               []  take home patch       []  in clinic    min []  Ice     []  Heat    location/position:     min []  Vasopneumatic Device, press/temp:     min []  Other:    [] Skin assessment post-treatment (if applicable):    []  intact    []  no adverse reaction     []redness  adverse reaction:        45 min Therapeutic Exercise:  [x]  See flow sheet (Billed 25 minutes)   Rationale:      increase ROM, increase strength and improve balance to improve the patients ability to complete amb, standing, transfers    15 min Manual Therapy: STM to B l/s paraspinals, lower t/s paraspinals, glute max/med, piriformis in prone. (Billed 15 minutes)   Rationale:      decrease pain, increase ROM and decrease trigger points to improve patient's ability to complete amb, standing, transfers    N/A min Patient Education:  yes  Reviewed HEP   []  Progressed/Changed HEP based on:  Reviewed importance of body mechanics. Pt reports understanding. Other Objective/Functional Measures:    Good body mechanics with transfers today     Post Treatment Pain Level (on 0 to 10) scale:   2 / 10     ASSESSMENT  Assessment/Changes in Function:   Pt denied sharp pains or red flags with therapeutic ex. Pt reported min improvement in pain/sxs at end of session. []  See Progress Note/Recertification   Patient will continue to benefit from skilled PT services to modify and progress therapeutic interventions, address functional mobility deficits, address ROM deficits, address strength deficits, analyze and address soft tissue restrictions, analyze and cue movement patterns, analyze and modify body mechanics/ergonomics and address imbalance/dizziness to attain remaining goals. Progress toward goals / Updated goals:  Good mechanics with bridge today (min discomfort, no increase in pain levels)- progressing towards new goal #1. PLAN  [x]  Upgrade activities as tolerated yes Continue plan of care   []  Discharge due to :    []  Other:      Therapist: Glorious Lennox, PT    Date: 1/9/2018 Time: 9:41 AM     Future Appointments  Date Time Provider Sekou Major   1/11/2018 9:30 AM Devin Muñiz [facility]

## 2018-01-11 ENCOUNTER — HOSPITAL ENCOUNTER (OUTPATIENT)
Dept: PHYSICAL THERAPY | Age: 78
Discharge: HOME | End: 2018-01-11
Payer: MEDICARE

## 2018-01-11 PROCEDURE — 97140 MANUAL THERAPY 1/> REGIONS: CPT

## 2018-01-11 PROCEDURE — 97110 THERAPEUTIC EXERCISES: CPT

## 2018-01-11 NOTE — PROGRESS NOTES
PHYSICAL THERAPY - DAILY TREATMENT NOTE    Patient Name: Stephanie Olivarez        Date: 2018  : 1940   yes Patient  Verified  Visit #:     Insurance: Payor: Valeria Whitman / Plan: [place] MEDICARE PART A & B / Product Type: Medicare /      In time: 254 Out time:    Total Treatment Time: 63     Medicare Time Tracking (below)   Total Timed Codes (min):  57 1:1 Treatment Time:  30     TREATMENT AREA =  Low back pain [M54.5]    SUBJECTIVE  Pain Level (on 0 to 10 scale):  2  / 10   Medication Changes/New allergies or changes in medical history, any new surgeries or procedures?    no  If yes, update Summary List   Subjective Functional Status/Changes:  []  No changes reported     \"I think I over did it this morning. I did a lot of shoveling (ice)\"        OBJECTIVE      15  (42) min Therapeutic Exercise:  [x]  See flow sheet   Rationale:      increase ROM, increase strength and improve balance to improve the patients ability to complete amb, standing, transfers    15 min Manual Therapy: STM to B l/s paraspinals, lower t/s paraspinals, glute max/med, piriformis in prone. Rationale:      decrease pain, increase ROM and decrease trigger points to improve patient's ability to complete amb, standing, transfers       min Patient Education:  yes  Reviewed HEP   []  Progressed/Changed HEP based on: Other Objective/Functional Measures:    -increased resistance with clams to YTB     Post Treatment Pain Level (on 0 to 10) scale:   2  / 10     ASSESSMENT  Assessment/Changes in Function:     Pt demonstrates slow progress with core strength as per reduced pain and improved form with bridges and increased resistance with clams. Plan to progress to standing stability exercises as tolerated next session.      []  See Progress Note/Recertification   Patient will continue to benefit from skilled PT services to modify and progress therapeutic interventions, address functional mobility deficits, address ROM deficits and address strength deficits to attain remaining goals. Progress toward goals / Updated goals:    Met LTG#1; remaining goals in progress     PLAN  []  Upgrade activities as tolerated yes Continue plan of care   []  Discharge due to :    []  Other:      Therapist: Danika Patino, PT    Date: 1/11/2018 Time: 8:05 AM     Future Appointments  Date Time Provider Sekou Major   1/11/2018 9:30 AM Kenya Patino, [facility]

## 2018-02-16 NOTE — PROGRESS NOTES
[address] [place] [address] PHYSICAL THERAPY   Zuleyka Velasquezil, [place] [address],[facility], [facility] - Phone: [phone]  Fax: [phone]  Cal Adams [facility] PHYSICAL THERAPY          Patient Name: Danie Peterson : 1940   Treatment/Medical Diagnosis: Low back pain [M54.5]   Onset Date: chronic    Referral Source: Irvin Bowden,  Start of Care [facility]): 17   Prior Hospitalization: See Medical History Provider #: [id number]   Prior Level of Function: Hx chronic LBP, walks 30-40 min per day for exercise   Comorbidities: hiatal hernia, osteoporosis, OA, possible cardiac condition, sigmoidectomy    Medications: Verified on Patient Summary List   Visits from [facility]: 11 Missed Visits: 0     Goal/Measure of Progress Goal Met? 1. Pt will perform 10x10\" supine bridge with proper form, pain-free to improve axial stability for ADLs. Status at last Eval:  Current Status: yes yes   2. Pt will maintain MSR balance with EC x 30 seconds without LOB to dec risk of falls. Status at last Eval:  Current Status: n/a n/a   3. Pt to demonstrate independence with extensive HEP to prepare for discharge from PT. Status at last Eval:  Current Status: n/a n/a     Key Functional Changes/Progress: Pt demonstrated fair progress with PT as of last formal assessment completed on 18. Pt attended one additional f/u session and then did not return for further therapy, therefore formal assessment of goals not completed. Last treatment pt was noted to have slow progress with core strength as per reduced pain and improved form with bridges. G-Codes (GP): Mobility  [id number] Goal  CJ= 20-39%  D/C  CJ= 20-39%. The severity rating is based on the FOTO Score    Assessments/Recommendations: Discontinue therapy due to lack of attendance or compliance. If you have any questions/comments please contact us directly at [phone].    Thank you for allowing us to assist in the care of your patient. Therapist Signature: Geetha Rm.  EPI Hugo Date: 02/16/18   Reporting Period: 12/5/17 to 1/11/18 Time: 9:02 AM

## 2022-09-27 ENCOUNTER — HOSPITAL ENCOUNTER (OUTPATIENT)
Dept: LAB | Age: 82
Discharge: HOME | End: 2022-09-27
Payer: MEDICARE

## 2022-09-27 PROCEDURE — 88305 TISSUE EXAM BY PATHOLOGIST: CPT

## 2023-10-20 ENCOUNTER — HOSPITAL ENCOUNTER (OUTPATIENT)
Facility: HOSPITAL | Age: 83
Setting detail: SPECIMEN
Discharge: HOME | End: 2023-10-23
Payer: MEDICARE

## 2023-10-20 PROCEDURE — 88305 TISSUE EXAM BY PATHOLOGIST: CPT
